# Patient Record
Sex: MALE | Race: WHITE | NOT HISPANIC OR LATINO | Employment: OTHER | URBAN - METROPOLITAN AREA
[De-identification: names, ages, dates, MRNs, and addresses within clinical notes are randomized per-mention and may not be internally consistent; named-entity substitution may affect disease eponyms.]

---

## 2017-06-20 ENCOUNTER — OFFICE VISIT (OUTPATIENT)
Dept: NEUROLOGY | Facility: MEDICAL CENTER | Age: 77
End: 2017-06-20
Payer: MEDICARE

## 2017-06-20 VITALS
OXYGEN SATURATION: 95 % | WEIGHT: 229.6 LBS | BODY MASS INDEX: 32.87 KG/M2 | HEART RATE: 75 BPM | DIASTOLIC BLOOD PRESSURE: 90 MMHG | SYSTOLIC BLOOD PRESSURE: 132 MMHG | TEMPERATURE: 97.5 F | HEIGHT: 70 IN

## 2017-06-20 DIAGNOSIS — M54.17 LUMBOSACRAL RADICULOPATHY AT L5: Primary | ICD-10-CM

## 2017-06-20 PROCEDURE — 99205 OFFICE O/P NEW HI 60 MIN: CPT | Performed by: PSYCHIATRY & NEUROLOGY

## 2017-06-20 RX ORDER — GABAPENTIN 300 MG/1
CAPSULE ORAL
Qty: 120 CAP | Refills: 1 | Status: SHIPPED | OUTPATIENT
Start: 2017-06-20 | End: 2017-08-15

## 2017-06-20 ASSESSMENT — ENCOUNTER SYMPTOMS
MYALGIAS: 0
HEADACHES: 0
MEMORY LOSS: 0
FALLS: 0
FOCAL WEAKNESS: 1
SENSORY CHANGE: 1
CONSTIPATION: 0
NECK PAIN: 0
BACK PAIN: 1

## 2017-06-20 ASSESSMENT — PATIENT HEALTH QUESTIONNAIRE - PHQ9: CLINICAL INTERPRETATION OF PHQ2 SCORE: 0

## 2017-06-20 NOTE — PROGRESS NOTES
"Subjective:      Satish Garcia is a 76 y.o. male who presents with his wife, for consultation, from the office of Alexa Garay PA-C, for a history of persistent right sided lower back pain and sciatica, now status post lumbosacral laminectomy with little improvement.     NURIA Covarrubias is a pleasant, though unfortunate, 76 right hand  Portuguese is back pain problems began many years ago, but which had slowly and steadily worsened. He did undergo lumbosacral surgery with laminectomy on the left at L4/5 in January, 2017. Prior to the surgery EMG/NCV studies were done, revealing a \"neuropathy\", warranting surgery. Unfortunately, since surgery, even with physical therapy, the symptoms have really not changed at all.    At present he describes chronic back pain on the left side which radiates into the buttock, around the lateral 5 and posteriorly to the knee. It has never radiated further and distally. There are no paresthesias. The leg is a little bit weakened, he has some difficulty walking up stairs, leading with the left leg, but walking downstairs is not problematic. He seems to be able to stand up without issue. He is not tripping when he walks, he denies foot drop. The right leg is not similarly involved, he denies any issues with neck pain and Lhermitte phenomena. The upper extremities are not involved. Symptoms are easily worsened as soon as he stands up and begins to weight-bear, he has to sit down, things attenuate the longer he is seated, but again having to stand up and walk any type of distance worsens them again. Valsalva has no effect on symptoms, there has been no change in bowel or bladder function, there are no constrictive or tightening perceptions around the lower abdomen or pelvis, he denies analgesia in the perianal of her genital regions. There is no incontinence. His difficulty with sleeping, even sitting for longer periods is uncomfortable. He can lay supine for longer intervals while asleep " "without major issue.    Since his surgery, Motrin and Aleve have provided no benefit. He has not received any epidural steroids. Surgeons felt that he needed to see a neurologist because of his \"neuropathy\", and it sounds as if they felt there was little more that they could do, certainly from a surgical standpoint, to help his symptoms improve further. He has not been to see a pain specialist. He has not been started and any other type of membrane stabilizer to help with the nerve pain.    Medical, surgical and family histories are reviewed, there are no new drug allergies. He has a history of hypertension, dyslipidemia and GERD, no history of diabetes, malignancy, thyroid disease, autoimmune disease, blood dyscrasia, liver or kidney disease, neurodegenerative disease, demyelinating disease, seizures, CAD, PVD or CVA. Other than his recent laminectomy and decompression surgery, past surgical history is otherwise negative from my standpoint. Known in the family has a history of autoimmune disease, diabetes, thyroid disease or malignancy. 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 A good friend suffered from. The back pain makes life difficult to say the least, but at least he is golfing though this can be done only if he has a golf cart.    He is on Lasix, Zocor, Prilosec, Hyzaar and Norvasc.    Review of Systems   Constitutional: Negative for malaise/fatigue.   Gastrointestinal: Negative for constipation.   Genitourinary: Negative for urgency and frequency.   Musculoskeletal: Positive for back pain. Negative for myalgias, falls and neck pain.   Neurological: Positive for sensory change and focal weakness. Negative for headaches.   Psychiatric/Behavioral: Negative for memory loss.        Objective:     /90 mmHg  Pulse 75  Temp(Src) 36.4 °C (97.5 °F)  Ht 1.778 m (5' 10\")  Wt 104.146 kg (229 lb 9.6 oz)  BMI 32.94 kg/m2  " SpO2 95%     Physical Exam    He appears in notable distress. He cannot sit still for any extended period of time without having to shift position in his chair. His vital signs are stable though there is some elevation of blood pressure at 132/90. He is in a sinus rhythm. There is no malar rash, joint or temporal tenderness, jaw claudication, or allodynia. The neck is supple, range of motion is full, compression maneuvers and negative. Cardiac evaluation reveals a regular rhythm. Straight leg raising is negative bilaterally, there is no cross straight leg raising sign from the right side. The back is slightly tender to touch over the iliac crest on the left only. Distal pulses are intact, there is some mild bilateral lower extremity edema.    Fully oriented, there is no aphasia, apraxia, or inattention. Cranial nerve exam is intact, visual fields are full, PERRLA/EOMI, there is no bulbar dysfunction, sensory exams intact to pinprick and light touch across the midline. The jaw jerk is absent.    Motor exam reveals normal tone throughout, there is no tremor, asterixis, or drift. Strength is intact in all extremities except for some minimal weakness of EHL and dorsiflexion on the left. Reflexes are diminished throughout, they are present at both ankles with distraction, there are no asymmetries. Both toes are downgoing.    Repetitive movements with the left foot on toe tapping is slow when compared to the right, there is less of an asymmetry with side to side movements. Fine motor control is intact in the upper extremity. There is no appendicular dystaxia. He is slow to stand, he clearly favors the left leg and hip when he walks. There is no foot drop.    Sensory exam does reveal a subjective decrement of pin laterally over the left thigh and proximal calf. Vibration is loss in the stocking pattern to the shins bilaterally, joint position sense is intact.     Assessment/Plan:     1. Lumbosacral radiculopathy at L5  It  "sounds as if this gentleman does have a persistent neuropathic pain syndrome developed over years of slow and steady radicular irritation. Even with surgery, the nerve pain itself persists, made worse with weightbearing. He will likely require lifelong pain modulation, I will be referring him to a pain specialist, Jose Posada M.D., at [facility]. In the interim, I will start treating with Neurontin 300 mg daily at bedtime, increasing weekly by 300 mg up to 1200 mg daily at bedtime. I do not think additional diagnostics are indicated, but he will likely require longer term pain management, even including interventional treatments, though I will defer this to Dr. Posada. There is no additional neurologic input required at this time.    Whatever the results of the EMG/NCV studies were, he shows no signs on exam of a peripheral polyneuropathy, I suspect the \"neuropathy\" that was mentioned had more to do with a radicular neuropathic process. There was nothing else by history or exam to suggest an additional condition that is making his problems more refractory. The nature of neuropathic pain was reviewed in full, the rationale for my referral to a pain specialist for long-term treatment as well.    - REFERRAL TO PAIN CLINIC  - gabapentin (NEURONTIN) 300 MG Cap; 1 tab qhs for 1 week, then 2 tab qhs for 1 week, then 3 tab qhs for 1 week, then 4 tab qhs  Dispense: 120 Cap; Refill: 1    Time: Evaluation 60 minutes for exam come review, discussion, and education  Discussion: As mentioned in the assessment, over 50% time spent face-to-face counseling and coordinating care    "

## 2017-06-20 NOTE — MR AVS SNAPSHOT
"        Satish Garcia   2017 10:20 AM   Office Visit   MRN: [id number]    Department:  [facility]   Dept Phone:  [phone]    Description:  Male : 1940   Provider:  Hieu Jones M.D.           Reason for Visit     New Patient left leg pain x's 6-7 years ago      Allergies as of 2017     Allergen Noted Reactions    Caviar 2017   Anaphylaxis      You were diagnosed with     Lumbosacral radiculopathy at L5   [599140]  -  Primary       Vital Signs     Blood Pressure Pulse Temperature Height Weight Body Mass Index    132/90 mmHg 75 36.4 °C (97.5 °F) 1.778 m (5' 10\") 104.146 kg (229 lb 9.6 oz) 32.94 kg/m2    Oxygen Saturation Smoking Status                95% Never Smoker           Basic Information     Date Of Birth Sex Race Ethnicity Preferred Language    1940 Male White Non- English      Health Maintenance        Date Due Completion Dates    IMM DTaP/Tdap/Td Vaccine (1 - Tdap) 1957 ---    COLONOSCOPY 1988 ---    IMM ZOSTER VACCINE 1998 ---    IMM PNEUMOCOCCAL 65+ (ADULT) LOW/MEDIUM RISK SERIES (2 of 2 - PPSV23) 2018            Current Immunizations     13-VALENT PCV PREVNAR 2017  3:03 PM      Below and/or attached are the medications your provider expects you to take. Review all of your home medications and newly ordered medications with your provider and/or pharmacist. Follow medication instructions as directed by your provider and/or pharmacist. Please keep your medication list with you and share with your provider. Update the information when medications are discontinued, doses are changed, or new medications (including over-the-counter products) are added; and carry medication information at all times in the event of emergency situations     Allergies:  CAVIAR - Anaphylaxis               Medications  Valid as of: 2017 -  4:26 PM    Generic Name Brand Name Tablet Size Instructions for use    AmLODIPine Besylate (Tab) " NORVASC 5 MG Take 5 mg by mouth every day. Indications: High Blood Pressure        Furosemide (Tab) LASIX 20 MG Take 20 mg by mouth 2 times a day.        Gabapentin (Cap) NEURONTIN 300 MG 1 tab qhs for 1 week, then 2 tab qhs for 1 week, then 3 tab qhs for 1 week, then 4 tab qhs        Losartan Potassium-HCTZ (Tab) HYZAAR 100-12.5 MG Take 1 Tab by mouth every day.        Omeprazole (CAPSULE DELAYED RELEASE) PRILOSEC 40 MG Take 40 mg by mouth every day.        Potassium Chloride Clarissa CR (Tab CR) K-DUR 10 MEQ Take 10 mEq by mouth 2 times a day.        Simvastatin (Tab) ZOCOR 40 MG Take 40 mg by mouth every evening.        .                 Medicines prescribed today were sent to:     [facility] [place], [place] [address] [facility]    [address] [place] [address]    Phone: [phone] Fax: [phone]    Open 24 Hours?: No      Medication refill instructions:       If your prescription bottle indicates you have medication refills left, it is not necessary to call your provider’s office. Please contact your pharmacy and they will refill your medication.    If your prescription bottle indicates you do not have any refills left, you may request refills at any time through one of the following ways: The online [employer] system (except Urgent Care), by calling your provider’s office, or by asking your pharmacy to contact your provider’s office with a refill request. Medication refills are processed only during regular business hours and may not be available until the next business day. Your provider may request additional information or to have a follow-up visit with you prior to refilling your medication.   *Please Note: Medication refills are assigned a new Rx number when refilled electronically. Your pharmacy may indicate that no refills were authorized even though a new prescription for the same medication is available at the pharmacy. Please request the medicine by name with the pharmacy before contacting  your provider for a refill.        Referral     A referral request has been sent to our patient care coordination department. Please allow 3-5 business days for us to process this request and contact you either by phone or mail. If you do not hear from us by the 5th business day, please call us at [phone].           MyChart Status: Patient Declined

## 2017-08-15 DIAGNOSIS — Z01.810 PRE-OPERATIVE CARDIOVASCULAR EXAMINATION: ICD-10-CM

## 2017-08-15 LAB — EKG IMPRESSION: NORMAL

## 2017-08-15 PROCEDURE — 93005 ELECTROCARDIOGRAM TRACING: CPT

## 2017-08-15 PROCEDURE — 93010 ELECTROCARDIOGRAM REPORT: CPT | Performed by: INTERNAL MEDICINE

## 2017-08-15 RX ORDER — PREGABALIN 50 MG/1
50 CAPSULE ORAL 2 TIMES DAILY
COMMUNITY

## 2017-08-15 RX ORDER — VALSARTAN AND HYDROCHLOROTHIAZIDE 320; 25 MG/1; MG/1
1 TABLET, FILM COATED ORAL DAILY
COMMUNITY

## 2017-08-15 RX ORDER — FUROSEMIDE 40 MG/1
40 TABLET ORAL DAILY
COMMUNITY

## 2017-08-18 ENCOUNTER — HOSPITAL ENCOUNTER (OUTPATIENT)
Facility: MEDICAL CENTER | Age: 77
End: 2017-08-18
Attending: PHYSICAL MEDICINE & REHABILITATION | Admitting: PHYSICAL MEDICINE & REHABILITATION
Payer: MEDICARE

## 2017-08-18 ENCOUNTER — APPOINTMENT (OUTPATIENT)
Dept: RADIOLOGY | Facility: MEDICAL CENTER | Age: 77
End: 2017-08-18
Attending: PHYSICAL MEDICINE & REHABILITATION
Payer: MEDICARE

## 2017-08-18 VITALS
TEMPERATURE: 98 F | BODY MASS INDEX: 34.84 KG/M2 | WEIGHT: 235.23 LBS | SYSTOLIC BLOOD PRESSURE: 156 MMHG | HEIGHT: 69 IN | DIASTOLIC BLOOD PRESSURE: 97 MMHG | HEART RATE: 54 BPM | RESPIRATION RATE: 16 BRPM | OXYGEN SATURATION: 96 %

## 2017-08-18 PROBLEM — G89.4 CHRONIC PAIN DISORDER: Status: ACTIVE | Noted: 2017-08-18

## 2017-08-18 PROCEDURE — 72100 X-RAY EXAM L-S SPINE 2/3 VWS: CPT

## 2017-08-18 PROCEDURE — 700111 HCHG RX REV CODE 636 W/ 250 OVERRIDE (IP)

## 2017-08-18 PROCEDURE — 160035 HCHG PACU - 1ST 60 MINS PHASE I: Performed by: PHYSICAL MEDICINE & REHABILITATION

## 2017-08-18 PROCEDURE — A6402 STERILE GAUZE <= 16 SQ IN: HCPCS | Performed by: PHYSICAL MEDICINE & REHABILITATION

## 2017-08-18 PROCEDURE — 700105 HCHG RX REV CODE 258: Performed by: PHYSICAL MEDICINE & REHABILITATION

## 2017-08-18 PROCEDURE — C1767 GENERATOR, NEURO NON-RECHARG: HCPCS | Performed by: PHYSICAL MEDICINE & REHABILITATION

## 2017-08-18 PROCEDURE — 160002 HCHG RECOVERY MINUTES (STAT): Performed by: PHYSICAL MEDICINE & REHABILITATION

## 2017-08-18 PROCEDURE — 160025 RECOVERY II MINUTES (STATS): Performed by: PHYSICAL MEDICINE & REHABILITATION

## 2017-08-18 PROCEDURE — C1778 LEAD, NEUROSTIMULATOR: HCPCS | Performed by: PHYSICAL MEDICINE & REHABILITATION

## 2017-08-18 PROCEDURE — 700102 HCHG RX REV CODE 250 W/ 637 OVERRIDE(OP)

## 2017-08-18 PROCEDURE — 160046 HCHG PACU - 1ST 60 MINS PHASE II: Performed by: PHYSICAL MEDICINE & REHABILITATION

## 2017-08-18 PROCEDURE — A6222 GAUZE <=16 IN NO W/SAL W/O B: HCPCS | Performed by: PHYSICAL MEDICINE & REHABILITATION

## 2017-08-18 PROCEDURE — 502000 HCHG MISC OR IMPLANTS RC 0278: Performed by: PHYSICAL MEDICINE & REHABILITATION

## 2017-08-18 PROCEDURE — 501445 HCHG STAPLER, SKIN DISP: Performed by: PHYSICAL MEDICINE & REHABILITATION

## 2017-08-18 PROCEDURE — 700101 HCHG RX REV CODE 250

## 2017-08-18 PROCEDURE — 160009 HCHG ANES TIME/MIN: Performed by: PHYSICAL MEDICINE & REHABILITATION

## 2017-08-18 PROCEDURE — 160048 HCHG OR STATISTICAL LEVEL 1-5: Performed by: PHYSICAL MEDICINE & REHABILITATION

## 2017-08-18 PROCEDURE — 160029 HCHG SURGERY MINUTES - 1ST 30 MINS LEVEL 4: Performed by: PHYSICAL MEDICINE & REHABILITATION

## 2017-08-18 PROCEDURE — 502240 HCHG MISC OR SUPPLY RC 0272: Performed by: PHYSICAL MEDICINE & REHABILITATION

## 2017-08-18 PROCEDURE — 160041 HCHG SURGERY MINUTES - EA ADDL 1 MIN LEVEL 4: Performed by: PHYSICAL MEDICINE & REHABILITATION

## 2017-08-18 PROCEDURE — A9270 NON-COVERED ITEM OR SERVICE: HCPCS

## 2017-08-18 DEVICE — IMPLANTABLE DEVICE: Type: IMPLANTABLE DEVICE | Site: BACK | Status: FUNCTIONAL

## 2017-08-18 RX ORDER — SODIUM CHLORIDE, SODIUM LACTATE, POTASSIUM CHLORIDE, CALCIUM CHLORIDE 600; 310; 30; 20 MG/100ML; MG/100ML; MG/100ML; MG/100ML
INJECTION, SOLUTION INTRAVENOUS CONTINUOUS
Status: DISCONTINUED | OUTPATIENT
Start: 2017-08-18 | End: 2017-08-18 | Stop reason: HOSPADM

## 2017-08-18 RX ORDER — MIDAZOLAM HYDROCHLORIDE 1 MG/ML
INJECTION INTRAMUSCULAR; INTRAVENOUS
Status: DISCONTINUED
Start: 2017-08-18 | End: 2017-08-18 | Stop reason: HOSPADM

## 2017-08-18 RX ORDER — LIDOCAINE HYDROCHLORIDE 10 MG/ML
INJECTION, SOLUTION INFILTRATION; PERINEURAL
Status: DISCONTINUED | OUTPATIENT
Start: 2017-08-18 | End: 2017-08-18 | Stop reason: HOSPADM

## 2017-08-18 RX ORDER — BUPIVACAINE HYDROCHLORIDE AND EPINEPHRINE 5; 5 MG/ML; UG/ML
INJECTION, SOLUTION PERINEURAL
Status: DISCONTINUED | OUTPATIENT
Start: 2017-08-18 | End: 2017-08-18 | Stop reason: HOSPADM

## 2017-08-18 RX ORDER — HALOPERIDOL 5 MG/ML
INJECTION INTRAMUSCULAR
Status: COMPLETED
Start: 2017-08-18 | End: 2017-08-18

## 2017-08-18 RX ORDER — OXYCODONE HCL 5 MG/5 ML
SOLUTION, ORAL ORAL
Status: COMPLETED
Start: 2017-08-18 | End: 2017-08-18

## 2017-08-18 RX ORDER — BACITRACIN 50000 [IU]/1
INJECTION, POWDER, FOR SOLUTION INTRAMUSCULAR
Status: DISCONTINUED | OUTPATIENT
Start: 2017-08-18 | End: 2017-08-18 | Stop reason: HOSPADM

## 2017-08-18 RX ADMIN — OXYCODONE HYDROCHLORIDE 10 MG: 5 SOLUTION ORAL at 17:18

## 2017-08-18 RX ADMIN — HALOPERIDOL LACTATE 1 MG: 5 INJECTION, SOLUTION INTRAMUSCULAR at 17:18

## 2017-08-18 RX ADMIN — SODIUM CHLORIDE, POTASSIUM CHLORIDE, SODIUM LACTATE AND CALCIUM CHLORIDE: 600; 310; 30; 20 INJECTION, SOLUTION INTRAVENOUS at 14:50

## 2017-08-18 ASSESSMENT — PAIN SCALES - GENERAL
PAINLEVEL_OUTOF10: 3
PAINLEVEL_OUTOF10: 2

## 2017-08-18 NOTE — IP AVS SNAPSHOT
8/18/2017    Satish Garcia  [id number]  [place] [address]    Dear Satish:    [facility] wants to ensure your discharge home is safe and you or your loved ones have had all of your questions answered regarding your care after you leave the hospital.    Below is a list of resources and contact information should you have any questions regarding your hospital stay, follow-up instructions, or active medical symptoms.    Questions or Concerns Regarding… Contact   Medical Questions Related to Your Discharge  (7 days a week, 8am-5pm) Contact a Nurse Care Coordinator   [phone]   Medical Questions Not Related to Your Discharge  (24 hours a day / 7 days a week)  Contact the Nurse Health Line   [phone]    Medications or Discharge Instructions Refer to your discharge packet   or contact your [facility] Primary Care Provider   [phone]   Follow-up Appointment(s) Schedule your appointment via [employer]   or contact Scheduling [phone]   Billing Review your statement via [employer]  or contact Billing [phone]   Medical Records Review your records via [employer]   or contact Medical Records [phone]     You may receive a telephone call within two days of discharge. This call is to make certain you understand your discharge instructions and have the opportunity to have any questions answered. You can also easily access your medical information, test results and upcoming appointments via the [employer] free online health management tool. You can learn more and sign up at [facility]iron. For assistance setting up your [employer] account, please call [phone].    Once again, we want to ensure your discharge home is safe and that you have a clear understanding of any next steps in your care. If you have any questions or concerns, please do not hesitate to contact us, we are here for you. Thank you for choosing [facility] for your healthcare needs.    Sincerely,    Your [facility] Healthcare Team

## 2017-08-18 NOTE — IP AVS SNAPSHOT
" Home Care Instructions                                                                                                                Name:Satish Garcia  Medical Record Number:[id number]  CSN: [id number]    YOB: 1940   Age: 77 y.o.  Sex: male  HT:1.753 m (5' 9.02\") WT: 106.7 kg (235 lb 3.7 oz)          Admit Date: 8/18/2017     Discharge Date:   Today's Date: 8/18/2017  Attending Doctor:  Jose Posada D.O.                  Allergies:  Caviar                Discharge Instructions         ACTIVITY: Rest and take it easy for the first 24 hours.  A responsible adult is recommended to remain with you during that time.  It is normal to feel sleepy.  We encourage you to not do anything that requires balance, judgment or coordination.    MILD FLU-LIKE SYMPTOMS ARE NORMAL. YOU MAY EXPERIENCE GENERALIZED MUSCLE ACHES, THROAT IRRITATION, HEADACHE AND/OR SOME NAUSEA.    FOR 24 HOURS DO NOT:  Drive, operate machinery or run household appliances.  Drink beer or alcoholic beverages.   Make important decisions or sign legal documents.    SPECIAL INSTRUCTIONS: ***BRING CONTROLLER IN BOX TO APPOINTMENT.  DO NOT DO ANYTHING WITH CONTROLLER.     DIET: To avoid nausea, slowly advance diet as tolerated, avoiding spicy or greasy foods for the first day.  Add more substantial food to your diet according to your physician's instructions.  Babies can be fed formula or breast milk as soon as they are hungry.  INCREASE FLUIDS AND FIBER TO AVOID CONSTIPATION.    SURGICAL DRESSING/BATHING: **DO NOT USE A POOL, HOT TUB, OR BATH TUB UNTIL YOUR STAPLES HAVE BEEN REMOVED. OK TO TAKE A SHOWER 72 HOURS AFTER THE PROCEDURE.  TAKE MEDICATIONS AS PRESCRIBED. MAY CHANGE BANDAGES TWICE A DAY STARTING ON 8/19/17. MAY USE ICE TO INCISION SITES.    FOLLOW-UP APPOINTMENT:  A follow-up appointment should be arranged with your doctor *; call to schedule.    You should CALL YOUR PHYSICIAN if you develop:  Fever greater than 101 degrees F.  Pain not " relieved by medication, or persistent nausea or vomiting.  Excessive bleeding (blood soaking through dressing) or unexpected drainage from the wound.  Extreme redness or swelling around the incision site, drainage of pus or foul smelling drainage.  Inability to urinate or empty your bladder within 8 hours.  Problems with breathing or chest pain.    You should call 911 if you develop problems with breathing or chest pain.  If you are unable to contact your doctor or surgical center, you should go to the nearest emergency room or urgent care center.  Physician's telephone #: *[phone]    If any questions arise, call your doctor.  If your doctor is not available, please feel free to call the Surgical Center at {Surgical Dept Numbers:[id number]}.  The Center is open Monday through Friday from 7AM to 7PM.  You can also call the [employer] HOTLINE open 24 hours/day, 7 days/week and speak to a nurse at [phone], or toll free at [phone].    A registered nurse may call you a few days after your surgery to see how you are doing after your procedure.    MEDICATIONS: Resume taking daily medication.  Take prescribed pain medication with food.  If no medication is prescribed, you may take non-aspirin pain medication if needed.  PAIN MEDICATION CAN BE VERY CONSTIPATING.  Take a stool softener or laxative such as senokot, pericolace, or milk of magnesia if needed.    Prescription given for ***.  Last pain medication given at ***.    If your physician has prescribed pain medication that includes Acetaminophen (Tylenol), do not take additional Acetaminophen (Tylenol) while taking the prescribed medication.    Depression / Suicide Risk    As you are discharged from this [facility] facility, it is important to learn how to keep safe from harming yourself.    Recognize the warning signs:  · Abrupt changes in personality, positive or negative- including increase in energy   · Giving away possessions  · Change in eating patterns-  significant weight changes-  positive or negative  · Change in sleeping patterns- unable to sleep or sleeping all the time   · Unwillingness or inability to communicate  · Depression  · Unusual sadness, discouragement and loneliness  · Talk of wanting to die  · Neglect of personal appearance   · Rebelliousness- reckless behavior  · Withdrawal from people/activities they love  · Confusion- inability to concentrate     If you or a loved one observes any of these behaviors or has concerns about self-harm, here's what you can do:  · Talk about it- your feelings and reasons for harming yourself  · Remove any means that you might use to hurt yourself (examples: pills, rope, extension cords, firearm)  · Get professional help from the community (Mental Health, Substance Abuse, psychological counseling)  · Do not be alone:Call your Safe Contact- someone whom you trust who will be there for you.  · Call your local CRISIS HOTLINE 561-7462 or 505-388-7962  · Call your local Children's Mobile Crisis Response Team [place] (848) 194-9274 or www.[facility]  · Call the toll free National Suicide Prevention Hotlines   · National Suicide Prevention Lifeline 622-780-XODM (3800)  · National Hope Line Network 800-SUICIDE (489-2129)       Medication List      ASK your doctor about these medications        Instructions    Morning Afternoon Evening Bedtime    furosemide 40 MG Tabs   Commonly known as:  LASIX        Take 40 mg by mouth every day.   Dose:  40 mg                        LYRICA 50 MG capsule   Generic drug:  pregabalin        Take 50 mg by mouth 2 times a day.   Dose:  50 mg                        omeprazole 40 MG delayed-release capsule   Commonly known as:  PRILOSEC        Take 40 mg by mouth every day.   Dose:  40 mg                        potassium chloride SA 10 MEQ Tbcr   Commonly known as:  K-DUR        Take 10 mEq by mouth 2 times a day.   Dose:  10 mEq                        simvastatin 40 MG Tabs   Commonly  known as:  ZOCOR        Take 40 mg by mouth every evening.   Dose:  40 mg                        SLEEP AID (DIPHENHYDRAMINE) PO        Take  by mouth every evening.                        valsartan-hydrochlorothiazide 320-25 MG per tablet   Commonly known as:  DIOVAN-HCT        Take 1 Tab by mouth every day.   Dose:  1 Tab                                Medication Information     Above and/or attached are the medications your physician expects you to take upon discharge. Review all of your home medications and newly ordered medications with your doctor and/or pharmacist. Follow medication instructions as directed by your doctor and/or pharmacist. Please keep your medication list with you and share with your physician. Update the information when medications are discontinued, doses are changed, or new medications (including over-the-counter products) are added; and carry medication information at all times in the event of emergency situations.        Resources     Quit Smoking / Tobacco Use:    I understand the use of any tobacco products increases my chance of suffering from future heart disease or stroke and could cause other illnesses which may shorten my life. Quitting the use of tobacco products is the single most important thing I can do to improve my health. For further information on smoking / tobacco cessation call a Toll Free Quit Line at 1-735.605.2209 (*National Cancer [place]) or 1-884.488.9963 (American Lung Association) or you can access the web based program at www.lungusa.org.    [place] Tobacco Users Help Line:  (900) 558-8189       Toll Free: 1-295.376.9081  Quit Tobacco Program [facility] Management Services (039)531-3628    Crisis Hotline:    [place] Crisis Hotline:  0-815-IJIEOUK or 1-894.885.1878    [place] Crisis Hotline:    1-437.985.3677 or 639-332-5972    Discharge Survey:   Thank you for choosing [facility]. We hope we did everything we could to make your hospital stay a pleasant  one. You may be receiving a survey and we would appreciate your time and participation in answering the questions. Your input is very valuable to us in our efforts to improve our service to our patients and their families.            Signatures     My signature on this form indicates that:    1. I acknowledge receipt and understanding of these Home Care Instruction.  2. My questions regarding this information have been answered to my satisfaction.  3. I have formulated a plan with my discharge nurse to obtain my prescribed medications for home.    __________________________________      __________________________________                   Patient Signature                                 Guardian/Responsible Adult Signature      __________________________________                 __________       ________                       Nurse Signature                                               Date                 Time

## 2017-08-18 NOTE — OR NURSING
Patient allergies and NPO status verified, home medication reconciliation completed, belongings secured. Patient verbalizes understanding of pain scale, expected course of stay and plan of care. Surgical site verified with patient, IV access established sequentials placed on BLE.

## 2017-08-18 NOTE — OP REPORT
Surgeon: Jose Posada DO    Assistants:  None    Preoperative diagnosis:  Chronic pain syndrome    Post operative diagnosis: Chronic pain syndrome    Type of Sedation:  Local anesthesia and conscious sedation    Anesthesiologist:  Dr. Ortega    Site of Service:  [facility]     Procedure:  1. Insertion of a Permanent Midline Octrode Spinal Cord Stimulator Lead  2. Insertion of a Permanent Left Octrode Spinal Cord Stimulator Lead  3. Intraoperative Neurostimulation Trial  4. Creation of a Left Implantable Power Generator (IPG) Pocket  5. Tunneling of the Spinal Cord Stimulator Leads  6. Implantation of the IPG    Method of Surgery:  After discussing risks, benefits, and alternatives to the procedure, the patient expressed understanding and wished to proceed.  An IV was started in the pre-op area and IV fluid administration was continued throughout the procedure.  The patient was brought into the fluoroscopy suite and placed in the prone position.  Procedural pause was conducted to verify: correct patient identity, procedure to be performed and as applicable, correct side and site, correct patient position, and availability of implants, special equipment or special instruments.  Intravenous sedation appropriate to the procedure was administered by the physician/nurse and is accurately reflected in the nurse's notes. Blood pressure, heart rate, pulse oximetry, and cardiac monitoring were monitored throughout the procedure. The patient's vital signs remained stable throughout the procedure.  EKG monitoring revealed normal sinus rhythm.  A dose of Ancef was administered intravenously prior to starting the procedure.     The skin was sterilely prepped and draped in the usual fashion using betadine times three in the region that the procedure was to be performed.      Insertion of a Permanent Midline Octrode Spinal Cord Stimulator Lead    The right T12 - L1 interlaminar space was identified  fluoroscopically.  The skin and subcutaneous tissues overlying the right L1 lamina were anesthetized with 1% Lidocaine without epinephrine using a 25G 1.5 inch needle and a 25G 3.5 inch spinal needle for deeper tissues.  Then the 25 gauge 3.5 inch spinal needle was used to inject 0.5% bupivicaine with epinephrine for further local anesthetic control and hemostasis.  A 10-blade scalpel was used to make a 5 cm midline incision overlying the L1 spinous process to the L4 spinous process.  Blunt dissection was carried out to expose the bilateral interlaminar ligaments.  After blunt dissection was completed, the incision was copiously irrigated with bacitracin solution.  A 14-gauge 4.5 inch Tuohy epidural needle was inserted through the incision to the right L1 lamina and was then “walked off” the superior aspect of this lamina into the T12-L1 epidural space.  The epidural space was localized using a loss of resistance technique and intermittent fluoroscopic guidance.  An octrode spinal cord stimulator lead was then advanced up the midline of the posterior epidural space to the top of T8 vertebra.  The 14-gauge 4.5 inch Tuohy epidural needle was then removed using a push-pull technique under direct fluoroscopic visualization to make sure the lead did not move.  .  The proximal portion of the lead was anchored to the right interlaminar ligament using standard technique with interrupted 2-0 Nurolon sutures and a torpedo anchor.     Insertion of a Permanent Left Octrode Spinal Cord Stimulator Lead    The left T12 - L1 interlaminar space was identified fluoroscopically.  The skin and subcutaneous tissues overlying the left L1 lamina were anesthetized with 1% Lidocaine without epinephrine using a 25G 1.5 inch needle and a 25G 3.5 inch spinal needle for deeper tissues.  Then the 25 gauge 3.5 inch spinal needle was used to inject 0.5% bupivicaine with epinephrine for further local anesthetic control and hemostasis.  A 14-gauge  4.5 inch Tuohy epidural needle was inserted through the previously made midline incision to the left L1 lamina and was then “walked off” the superior aspect of this lamina into the T12-L1 epidural space.  The epidural space was localized using a loss of resistance technique and intermittent fluoroscopic guidance.  An octrode spinal cord stimulator lead was then advanced up the left side of the posterior epidural space to the top of T8 vertebra.  The 14-gauge 4.5 inch Tuohy epidural needle was then removed using a push-pull technique under direct fluoroscopic visualization to make sure the lead did not move.  The proximal portion of the lead was anchored to the left interlaminar ligament using standard technique with interrupted 2-0 Nurolon sutures and a torpedo anchor.     Intraoperative Neurostimulation Trial    At this point, the distal ends of the leads were inserted and locked into the trial stimulator connectors   In order to preserve the sterile field, the analysis and programming of the device was passed off to the [facility] .  Under my personal supervision and instruction, Impedance was checked and multiple electrode combinations were utilized to provide paresthesia coverage of the patient's area of pain.  No abdominal or rib/sternal stimulation was noted by the patient.    Creation of a Left IPG Pocket    The skin and subcutaneous tissues overlying the left gluteal skin was anesthetized with 1% Lidocaine without epinephrine using a 25G 1.5 inch needle and a 25G 3.5 inch spinal needle for deeper tissues.  Then the 25 gauge 3.5 inch spinal needle was used to inject 0.5% bupivicaine with epinephrine for further local anesthetic control and hemostasis. A 5 cm horizontal incision was made with the 10-blade scalpel on the left gluteal skin below the level of the belt line and lateral to the mid-gluteal line.  A generous subcutaneous pocket was created using blunt dissection that was  approximately 2 cm deep to the skin's surface.  After it was completed, the pocket was copiously irrigated with bacitracin solution and packed with sterile 4x4's.      Tunneling of the Spinal Cord Stimulator Leads    A long trocar with tunneling device was then passed from the caudal end of the midline incision to the superomedial aspect of the left IPG pocket.  The long trocar was removed leaving the tunneling device.  The spinal cord stimulator leads were pulled through the tunneling device.  The left spinal cord stimulator lead was connected to the inferior connector of the IPG.  The right spinal cord stimulator lead was connected to the superior connector of the IPG.  After connection to the IPG, the system was activated confirming that the system was operational.    Implantation of the IPG    After the distal ends of the spinal cord stimulator leads were placed into the IPG, the sterile 4x4's in the IPG pocket were removed.  The remaining length of the spinal cord stimulator leads were coiled underneath the IPG and the IPG was placed into the pocket.  A sponge count was performed and all sponges were accounted for.  2-0 Vicryl was used to close the subcutaneous tissues of the pocket and midline incision with interrupted sutures.  The skin of both incisions were closed using a skin stapler.  An AP spot film was obtained to record the final lead positions.      The patient was seen in recovery and the patient received good stimulation that covered their targeted pain.  The patient was instructed in the proper use of the  and understood its use prior to discharge.  The patient tolerated the procedure well and there were no apparent complications.  After an appropriate amount of observation, the patient was dismissed from the clinic in good condition under their own power.      Complications:  None    Disposition:   1.  The patient was discharged to the recovery area in good condition.  2.  Discharge  instructions were provided and explained.  3.  Patient was instructed to call with any questions or problems.  4.  Apply ice to the procedure site and keep clean and dry for 24 hours.  5.  Medications were reviewed for efficacy and appropriateness.  6.  Continue current medications.  7.  Return in 1 to 2 weeks for follow up.

## 2017-08-19 NOTE — DISCHARGE INSTRUCTIONS
ACTIVITY: Rest and take it easy for the first 24 hours.  A responsible adult is recommended to remain with you during that time.  It is normal to feel sleepy.  We encourage you to not do anything that requires balance, judgment or coordination.    MILD FLU-LIKE SYMPTOMS ARE NORMAL. YOU MAY EXPERIENCE GENERALIZED MUSCLE ACHES, THROAT IRRITATION, HEADACHE AND/OR SOME NAUSEA.    FOR 24 HOURS DO NOT:  Drive, operate machinery or run household appliances.  Drink beer or alcoholic beverages.   Make important decisions or sign legal documents.    SPECIAL INSTRUCTIONS: ***BRING CONTROLLER IN BOX TO APPOINTMENT.  DO NOT DO ANYTHING WITH CONTROLLER.     DIET: To avoid nausea, slowly advance diet as tolerated, avoiding spicy or greasy foods for the first day.  Add more substantial food to your diet according to your physician's instructions.  Babies can be fed formula or breast milk as soon as they are hungry.  INCREASE FLUIDS AND FIBER TO AVOID CONSTIPATION.    SURGICAL DRESSING/BATHING: **DO NOT USE A POOL, HOT TUB, OR BATH TUB UNTIL YOUR STAPLES HAVE BEEN REMOVED. OK TO TAKE A SHOWER 72 HOURS AFTER THE PROCEDURE.  TAKE MEDICATIONS AS PRESCRIBED. MAY CHANGE BANDAGES TWICE A DAY STARTING ON 8/19/17. MAY USE ICE TO INCISION SITES.    FOLLOW-UP APPOINTMENT:  A follow-up appointment should be arranged with your doctor *; call to schedule.    You should CALL YOUR PHYSICIAN if you develop:  Fever greater than 101 degrees F.  Pain not relieved by medication, or persistent nausea or vomiting.  Excessive bleeding (blood soaking through dressing) or unexpected drainage from the wound.  Extreme redness or swelling around the incision site, drainage of pus or foul smelling drainage.  Inability to urinate or empty your bladder within 8 hours.  Problems with breathing or chest pain.    You should call 911 if you develop problems with breathing or chest pain.  If you are unable to contact your doctor or surgical center, you should go to  the nearest emergency room or urgent care center.  Physician's telephone #: *[phone]    If any questions arise, call your doctor.  If your doctor is not available, please feel free to call the Surgical Center at {Surgical Dept Numbers:[id number]}.  The Center is open Monday through Friday from 7AM to 7PM.  You can also call the HEALTH HOTLINE open 24 hours/day, 7 days/week and speak to a nurse at [phone], or toll free at [phone].    A registered nurse may call you a few days after your surgery to see how you are doing after your procedure.    MEDICATIONS: Resume taking daily medication.  Take prescribed pain medication with food.  If no medication is prescribed, you may take non-aspirin pain medication if needed.  PAIN MEDICATION CAN BE VERY CONSTIPATING.  Take a stool softener or laxative such as senokot, pericolace, or milk of magnesia if needed.    Prescription given for ***.  Last pain medication given at ***.    If your physician has prescribed pain medication that includes Acetaminophen (Tylenol), do not take additional Acetaminophen (Tylenol) while taking the prescribed medication.    Depression / Suicide Risk    As you are discharged from this [facility] facility, it is important to learn how to keep safe from harming yourself.    Recognize the warning signs:  · Abrupt changes in personality, positive or negative- including increase in energy   · Giving away possessions  · Change in eating patterns- significant weight changes-  positive or negative  · Change in sleeping patterns- unable to sleep or sleeping all the time   · Unwillingness or inability to communicate  · Depression  · Unusual sadness, discouragement and loneliness  · Talk of wanting to die  · Neglect of personal appearance   · Rebelliousness- reckless behavior  · Withdrawal from people/activities they love  · Confusion- inability to concentrate     If you or a loved one observes any of these behaviors or has concerns about  self-harm, here's what you can do:  · Talk about it- your feelings and reasons for harming yourself  · Remove any means that you might use to hurt yourself (examples: pills, rope, extension cords, firearm)  · Get professional help from the community (Mental Health, Substance Abuse, psychological counseling)  · Do not be alone:Call your Safe Contact- someone whom you trust who will be there for you.  · Call your local CRISIS HOTLINE 294-4872 or 011-451-2790  · Call your local Children's Mobile Crisis Response Team [place] (705) 274-4773 or www.[employer]  · Call the toll free National Suicide Prevention Hotlines   · National Suicide Prevention Lifeline 604-035-BMBW (3383)  · National Hope Line Network 800-SUICIDE (122-5440)

## 2017-08-19 NOTE — OR NURSING
NO PROBLEMS IN PACU/PACU 2 PHASE.  PATIENT AND HIS WIFE VERBALIZE GOOD UNDERSTANDING OF DISCHARGE INSTRUCTIONS. TEGADERM AND GAUZE MID-LOW  BACK AND LEFT LOWER BACK IN PLACE, MID DRSG HAS SCANT BLOODY DRAINAGE, HAS NOT INCREASED HERE. OOB TOLERATED WELL.

## 2019-07-15 NOTE — PROGRESS NOTES
"  REFERRING PHYSICIAN: Elmer Henson MD.     CONSULTING PHYSICIAN: Katarzyna Khoury MD, FACS.    CHIEF COMPLAINT: Shortness of breath.    HISTORY OF PRESENT ILLNESS: The patient is a 79 y.o. male with a history significant for hyperlipidemia, HTN, CHF, neuropathy, abdominal aortic aneurysm and severe aortic stenosis. Today he states he has been having increasing shortness of breath for the last 6 months.  He also complains of occasional lower extremity edema. He has an exercise capacity of 10 yards due to neuropathy and low back issues.  He denies chest pain/pressure, orthopnea, dizziness, lower extremity edema, syncope, or near syncope.        PAST MEDICAL HISTORY:   Past Medical History:   Diagnosis Date   • Anginal syndrome (CMS-HCC)    • Aortic aneurysm of unspecified site without mention of rupture     Told by Dr. Lakshmi Hampton to \"watch it - no surgery indicated\"   • Cataract     right eye   • High cholesterol    • Hyperlipidemia    • Hypertension    • Pneumonia        PAST SURGICAL HISTORY:   Past Surgical History:   Procedure Laterality Date   • SPINAL CORD STIMULATOR N/A 8/18/2017    Procedure: SPINAL CORD STIMULATOR;  Surgeon: Jose Posada D.O.;  Location: SURGERY [facility];  Service:    • LUMBAR LAMINECTOMY DISKECTOMY  1/16/2017    Procedure: MICRODISCECTOMY L4-5 LEFT, LEFT L4-5 HEMILAMINECTOMY, FACECTOMY, FORAMINOTOMIES L4-5 L5-S1;  Surgeon: Blanco Li M.D.;  Location: [facility];  Service:    • TONSILLECTOMY         FAMILY HISTORY:   Family History   Problem Relation Age of Onset   • Stroke Father         SOCIAL HISTORY:   Social History     Social History   • Marital status:      Spouse name: N/A   • Number of children: N/A   • Years of education: N/A     Occupational History   • Not on file.     Social History Main Topics   • Smoking status: Never Smoker   • Smokeless tobacco: Never Used   • Alcohol use Yes      Comment: 2 per day   • Drug use: No   • Sexual activity: " Not on file     Other Topics Concern   • Not on file     Social History Narrative   • No narrative on file       ALLERGIES:   Allergies   Allergen Reactions   • Caviar Anaphylaxis        CURRENT MEDICATIONS:     Current Outpatient Prescriptions:   •  valsartan-hydrochlorothiazide (DIOVAN-HCT) 320-25 MG per tablet, Take 1 Tab by mouth every day., Disp: , Rfl:   •  furosemide (LASIX) 40 MG Tab, Take 40 mg by mouth every day., Disp: , Rfl:   •  pregabalin (LYRICA) 50 MG capsule, Take 50 mg by mouth 2 times a day., Disp: , Rfl:   •  DiphenhydrAMINE HCl, Sleep, (SLEEP AID, DIPHENHYDRAMINE, PO), Take  by mouth every evening., Disp: , Rfl:   •  potassium chloride SA (K-DUR) 10 MEQ Tab CR, Take 10 mEq by mouth 2 times a day., Disp: , Rfl:   •  simvastatin (ZOCOR) 40 MG Tab, Take 40 mg by mouth every evening., Disp: , Rfl:   •  omeprazole (PRILOSEC) 40 MG delayed-release capsule, Take 40 mg by mouth every day., Disp: , Rfl:      LABS REVIEWED:  Lab Results   Component Value Date/Time    SODIUM 140 12/30/2016 03:15 PM    POTASSIUM 3.4 (L) 12/30/2016 03:15 PM    CHLORIDE 102 12/30/2016 03:15 PM    CO2 28 12/30/2016 03:15 PM    GLUCOSE 79 12/30/2016 03:15 PM    BUN 20 12/30/2016 03:15 PM    CREATININE 1.1 12/30/2016 03:15 PM      No results found for: PROTHROMBTM, INR   Lab Results   Component Value Date/Time    WBC 7.7 12/30/2016 03:15 PM    RBC 4.06 (L) 12/30/2016 03:15 PM    HEMOGLOBIN 15.0 12/30/2016 03:15 PM    HEMATOCRIT 40.8 (L) 12/30/2016 03:15 PM    .5 (H) 12/30/2016 03:15 PM    MCH 36.9 (H) 12/30/2016 03:15 PM    MCHC 36.8 12/30/2016 03:15 PM    MPV 9.4 12/30/2016 03:15 PM        IMAGING REVIEWED AND INTERPRETED:    ECHOCARDIOGRAM: 5/17/2019 at [facility]  EF 55-60%.  Mild LVH.  Grade 1 diastolic dysfunction with normal LV filling.  Significant aortic valve calcification w severe aortic stenosis.    AVMAX 394cm/s, peak gradient 63mmHg, mean 40mmHg.  SUZANNE 0.9cm2.  RV normal.    Anterior mitral valve leaflet thickened.  "    Chest CT 3/13/2019 at [facility]  Distal abdominal aorta measuring 4.0cm.     ANGIOGRAM: [facility] 7/2019  IMPRESSION:  1.  Normal coronary arteries.  2.  Severe aortic stenosis.      REVIEW OF SYSTEMS:   Review of Systems   Constitutional: Negative.    HENT: Negative.    Eyes: Positive for blurred vision.   Respiratory: Negative.    Cardiovascular: Negative.    Gastrointestinal: Negative.    Genitourinary: Negative.    Musculoskeletal: Positive for back pain and myalgias.   Skin: Negative.    Neurological: Positive for dizziness.   Endo/Heme/Allergies: Negative.    Psychiatric/Behavioral: Negative.      PHYSICAL EXAMINATION:   Physical Exam  CONSTITUTIONAL:  BP (!) 94/60 (BP Location: Left arm, Patient Position: Sitting, BP Cuff Size: Adult)   Pulse 88   Temp 36.3 °C (97.3 °F) (Temporal)   Ht 1.753 m (5' 9\")   Wt 104.3 kg (230 lb)   SpO2 95% .    General appearance: Frail, wheelchair bound, no apparent distress, normal development.  HEENT:  Anicteric sclerae, moist conjunctivae, moist mucous membranes, good dentition.  NECK:  Supple without jugular venous distention, without lymphadenopathy.  SKIN:   Normal skin turgor, no stasis dermatitis or ulcers noted.  RESPIRATORY:  Clear to auscultation bilaterally, respirations are regular, symmetrical and unlabored.   CARDIAC:  Regular rate and rhythm, 2/6 systolic murmur. No carotid bruits. Peripheral pulses palpable.   GASTROINTESTINAL:  Soft, non-distended, non-tender with bowel sounds present, no hepatosplenomegaly.  EXTREMITIES:  No clubbing, cyanosis, or changes consistent with peripheral vascular disease. No peripheral edema or varicosities.  NEUROLOGIC/ PSYCHIATRIC: Alert and oriented times three. Mood and affect normal.  MUSCULOSKELETAL:  Unsteady gait and station.    IMPRESSION:  Severe symptomatic aortic stenosis, congestive heart failure of valvular heart disease (NYHA class II).      PLAN:  The patient is an intermediate risk candidate for surgical aortic " valve replacement.  I estimate his operative risk to be 3 to 4%.  I am also concerned about his recovery due to his lumbar radiculopathy and unsteady gait.  He is currently using a wheelchair to get around. The STS mortality risk score is 3% and the morbidity and mortality risk score is 15%. The scores were discussed with patient    He is a better candidate for transcatheter aortic valve replacement (TAVR) and I would recommend proceeding with work-up.  Risks benefits potential complications and alternative treatments were discussed in detail with the patient.  The patient understands his risks and he is willing to proceed with TAVR.    Findings and recommendations have been discussed with the patient’s cardiologist, Dr. Elmer Henson.  Thank you for this very challenging consultation and participation in the patient’s care.  I will keep you apprised of all future developments.    Sincerely,     Katarzyna Khoury MD, FACS.        I,  Katarzyna Khoury MD, FACS performed a substantial portion of the EM visit face-to-face with the same patient on the same date of service with the APRN, REE Savage. I was personally involved in reviewing and interpreting the films and conducted elements of the history and physical exam. I performed all of the medical decision making for the patient.

## 2019-07-17 ENCOUNTER — OFFICE VISIT (OUTPATIENT)
Dept: SURGERY | Facility: MEDICAL CENTER | Age: 79
End: 2019-07-17
Payer: MEDICARE

## 2019-07-17 VITALS
BODY MASS INDEX: 34.07 KG/M2 | OXYGEN SATURATION: 95 % | HEIGHT: 69 IN | WEIGHT: 230 LBS | TEMPERATURE: 97.3 F | SYSTOLIC BLOOD PRESSURE: 94 MMHG | HEART RATE: 88 BPM | DIASTOLIC BLOOD PRESSURE: 60 MMHG

## 2019-07-17 DIAGNOSIS — I35.0 SEVERE AORTIC STENOSIS: ICD-10-CM

## 2019-07-17 PROCEDURE — 99205 OFFICE O/P NEW HI 60 MIN: CPT | Performed by: NURSE PRACTITIONER

## 2019-07-17 ASSESSMENT — ENCOUNTER SYMPTOMS
CARDIOVASCULAR NEGATIVE: 1
GASTROINTESTINAL NEGATIVE: 1
BLURRED VISION: 1
BACK PAIN: 1
DIZZINESS: 1
CONSTITUTIONAL NEGATIVE: 1
PSYCHIATRIC NEGATIVE: 1
MYALGIAS: 1
RESPIRATORY NEGATIVE: 1

## 2021-01-14 DIAGNOSIS — Z23 NEED FOR VACCINATION: ICD-10-CM

## (undated) DEVICE — DRAPE C-ARM LARGE 41IN X 74 IN - (10/BX 2BX/CA)

## (undated) DEVICE — PACK MINOR BASIN - (2EA/CA)

## (undated) DEVICE — SPONGE GAUZE STER 4X4 8-PL - (2/PK 50PK/BX 12BX/CS)

## (undated) DEVICE — SPONGE GAUZESTER 4 X 4 4PLY - (128PK/CA)

## (undated) DEVICE — DRESSING XEROFORM 1X8 - (50/BX 4BX/CA)

## (undated) DEVICE — NEPTUNE 4 PORT MANIFOLD - (20/PK)

## (undated) DEVICE — SYRINGE LOSS OF RESIST. 50/BX - (50/CA)

## (undated) DEVICE — Device

## (undated) DEVICE — DRAPE LARGE 3 QUARTER - (20/CA)

## (undated) DEVICE — GLOVE SZ 7.5 LF PROTEXIS (50PR/BX)

## (undated) DEVICE — PATIENT CONTROLLER

## (undated) DEVICE — GLOVE, LITE (PAIR)

## (undated) DEVICE — DRESSING TRANSPARENT FILM TEGADERM 2.375 X 2.75"  (100EA/BX)"

## (undated) DEVICE — GLOVE BIOGEL SZ 8 SURGICAL PF LTX - (50PR/BX 4BX/CA)

## (undated) DEVICE — SUTURE 2-0 SILK FS 18 (36PK/BX) DISCONTINUED USE #34812, LOWER UOM SAME PART # WITH G AT THE END"

## (undated) DEVICE — GLOVE BIOGEL PI ULTRATOUCH SZ 8.0 SURGICAL PF LF - (50/BX 4BX/CA)

## (undated) DEVICE — CHLORAPREP 26 ML APPLICATOR - ORANGE TINT(25/CA)

## (undated) DEVICE — DRAPE LAPAROTOMY T SHEET - (12EA/CA)

## (undated) DEVICE — DRESSING TRANSPARENT FILM TEGADERM 4 X 4.75" (50EA/BX)"

## (undated) DEVICE — STAPLER SKIN DISP - (6/BX 10BX/CA) VISISTAT

## (undated) DEVICE — SUTURE 2-0 VICRYL PLUS CT-1 - 8 X 18 INCH(12/BX)